# Patient Record
Sex: MALE | Race: WHITE | NOT HISPANIC OR LATINO | Employment: STUDENT | URBAN - METROPOLITAN AREA
[De-identification: names, ages, dates, MRNs, and addresses within clinical notes are randomized per-mention and may not be internally consistent; named-entity substitution may affect disease eponyms.]

---
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
## 2023-02-11 ENCOUNTER — HEALTH MAINTENANCE LETTER (OUTPATIENT)
Age: 18
End: 2023-02-11

## 2024-03-09 ENCOUNTER — HEALTH MAINTENANCE LETTER (OUTPATIENT)
Age: 19
End: 2024-03-09

## 2024-07-23 ENCOUNTER — TRANSFERRED RECORDS (OUTPATIENT)
Dept: HEALTH INFORMATION MANAGEMENT | Facility: CLINIC | Age: 19
End: 2024-07-23

## 2024-07-23 LAB
ALT SERPL-CCNC: 41 U/L (ref 0–40)
AST SERPL-CCNC: 21 U/L (ref 3–44)
CHOLESTEROL (EXTERNAL): 146 MG/DL (ref 0–199)
CREATININE (EXTERNAL): 1.08 MG/DL (ref 0.75–1.2)
GFR ESTIMATED (EXTERNAL): >90 ML/MIN/1.73 SQ M
GLUCOSE (EXTERNAL): 73 MG/DL (ref 74–100)
HBA1C MFR BLD: 4.9 % (ref 0–5.6)
HDLC SERPL-MCNC: 36 MG/DL
LDL CHOLESTEROL CALCULATED (EXTERNAL): 87 MG/DL
POTASSIUM (EXTERNAL): 4.2 MMOL/L (ref 3.4–5.3)
TRIGLYCERIDES (EXTERNAL): 117 MG/DL (ref 30–149)
TSH SERPL-ACNC: 2.06 UIU/ML (ref 0.35–4.94)

## 2024-09-03 ENCOUNTER — TRANSFERRED RECORDS (OUTPATIENT)
Dept: HEALTH INFORMATION MANAGEMENT | Facility: CLINIC | Age: 19
End: 2024-09-03

## 2025-01-09 ENCOUNTER — TRANSFERRED RECORDS (OUTPATIENT)
Dept: HEALTH INFORMATION MANAGEMENT | Facility: CLINIC | Age: 20
End: 2025-01-09

## 2025-04-27 ENCOUNTER — HEALTH MAINTENANCE LETTER (OUTPATIENT)
Age: 20
End: 2025-04-27